# Patient Record
Sex: FEMALE | Race: BLACK OR AFRICAN AMERICAN | NOT HISPANIC OR LATINO | Employment: OTHER | URBAN - METROPOLITAN AREA
[De-identification: names, ages, dates, MRNs, and addresses within clinical notes are randomized per-mention and may not be internally consistent; named-entity substitution may affect disease eponyms.]

---

## 2018-05-23 ENCOUNTER — OFFICE VISIT (OUTPATIENT)
Dept: SURGERY | Facility: CLINIC | Age: 53
End: 2018-05-23
Payer: MEDICAID

## 2018-05-23 VITALS
HEIGHT: 64 IN | SYSTOLIC BLOOD PRESSURE: 124 MMHG | HEART RATE: 69 BPM | WEIGHT: 212.94 LBS | BODY MASS INDEX: 36.35 KG/M2 | DIASTOLIC BLOOD PRESSURE: 73 MMHG

## 2018-05-23 DIAGNOSIS — R15.0 INCOMPLETE DEFECATION: Primary | ICD-10-CM

## 2018-05-23 DIAGNOSIS — K59.04 FUNCTIONAL CONSTIPATION: ICD-10-CM

## 2018-05-23 DIAGNOSIS — N81.6 RECTOCELE: ICD-10-CM

## 2018-05-23 PROCEDURE — 99999 PR PBB SHADOW E&M-NEW PATIENT-LVL III: CPT | Mod: PBBFAC,,, | Performed by: COLON & RECTAL SURGERY

## 2018-05-23 PROCEDURE — 99204 OFFICE O/P NEW MOD 45 MIN: CPT | Mod: S$PBB,,, | Performed by: COLON & RECTAL SURGERY

## 2018-05-23 PROCEDURE — 99203 OFFICE O/P NEW LOW 30 MIN: CPT | Mod: PBBFAC | Performed by: COLON & RECTAL SURGERY

## 2018-05-23 RX ORDER — MINERAL OIL
ENEMA (ML) RECTAL
Refills: 3 | COMMUNITY
Start: 2018-05-08

## 2018-05-23 RX ORDER — MONTELUKAST SODIUM 10 MG/1
TABLET ORAL
Refills: 3 | COMMUNITY
Start: 2018-05-08 | End: 2018-08-01

## 2018-05-23 RX ORDER — FLUOXETINE 10 MG/1
CAPSULE ORAL
Refills: 3 | COMMUNITY
Start: 2018-05-10

## 2018-05-23 RX ORDER — AMLODIPINE AND BENAZEPRIL HYDROCHLORIDE 10; 20 MG/1; MG/1
CAPSULE ORAL
Refills: 3 | COMMUNITY
Start: 2018-05-04

## 2018-05-23 RX ORDER — LINACLOTIDE 290 UG/1
CAPSULE, GELATIN COATED ORAL
Refills: 5 | COMMUNITY
Start: 2018-04-28

## 2018-05-23 RX ORDER — CLONAZEPAM 0.5 MG/1
0.5 TABLET ORAL 2 TIMES DAILY
Refills: 3 | COMMUNITY
Start: 2018-05-19

## 2018-05-23 RX ORDER — OXCARBAZEPINE 300 MG/1
600 TABLET, FILM COATED ORAL 2 TIMES DAILY
Refills: 3 | COMMUNITY
Start: 2018-05-10

## 2018-05-23 RX ORDER — ALBUTEROL SULFATE 90 UG/1
AEROSOL, METERED RESPIRATORY (INHALATION)
Refills: 3 | COMMUNITY
Start: 2018-05-10

## 2018-05-23 RX ORDER — ATORVASTATIN CALCIUM 20 MG/1
20 TABLET, FILM COATED ORAL DAILY
Refills: 3 | COMMUNITY
Start: 2018-05-08

## 2018-05-23 NOTE — LETTER
May 23, 2018      Gary Cotton MD  [address] [address]           [facility]  [address]  [facility] [address]  Phone: [phone]          Patient: Venus Rodriguez   MR Number: [id number]   YOB: 1965   Date of Visit: 5/23/2018       Dear Dr. Gary Cotton:    Thank you for referring Venus Rodriguez to me for evaluation. Attached you will find relevant portions of my assessment and plan of care.    If you have questions, please do not hesitate to call me. I look forward to following Venus Rodriguez along with you.    Sincerely,    Pérez Kuo MD    Enclosure  CC:  No Recipients    If you would like to receive this communication electronically, please contact [email or web address] or [phone] to request more information on [facility] access.    For providers and/or their staff who would like to refer a patient to [facility], please contact us through our one-stop-shop provider referral line, [facility], at [phone].    If you feel you have received this communication in error or would no longer like to receive these types of communications, please e-mail [email or web address]

## 2018-05-23 NOTE — PROGRESS NOTES
"COLORECTAL SURGERY  H&P      REASON FOR CONSULT:    Encounter Diagnosis   Name Primary?    Incomplete defecation Yes       SUBJECTIVE:     HISTORY OF PRESENT ILLNESS:  Venus Rodriguez is a 53 y.o. female who has been referred to colorectal surgery clinic for symptoms of constipation and incomplete defecation suspicious for rectocele.    Patient reports severe worsening of constipation ever since undergoing laparoscopic MINGO/BSO in 2007 by Dr. Braun at [facility].  She states she is unable to evacuate stool from her rectum which she describes as no longer being able to "push".  She has tried everything from Miralax to Amitiza for her constipation with no significant relief.  She is currently taking Linzess which she does states helped a little by means of making her stools more thin and liquid which makes them easier to pass.  She denies splinting through her vagina, but reports that she has to routinely on a daily basis use an enema bottle to flush out her rectum to get stool out which often comes out piecemeal.  Has multiple small difficult to pass BMs about 4x/day.  Occasional small amount of bleeding which she attributes to hemorrhoids.     Additional history pertinent for recurrent uncomplicated diverticulitis.  States she had around total of 4 episodes none of which have every required inpatient treatment.  She has never required any IR drainage or complications related to diverticulitis, and has only taken oral antibiotics outpatient which resolved her symptoms nicely.  States her last colonoscopy was in February this year, which showed some hemorrhoids and 1-2 polyps were removed.        Past medical history pertinent for, HTN, HLD, depression, anxiety, diverticulosis, seasonal allergies.  No history of MI or DVT/PE or clotting disorders.  She can easily go up a flight of stairs or walk a block without getting short of breath.     The patient's past surgical history is pertinent for prior laparoscopic " MINGO/BSA in 2007.     NKDA (only reports adverse symptoms with nausea or itching to sulfa and lortab).    Non-smoker.        MEDICATIONS:  Home Medications:  No current outpatient prescriptions on file prior to visit.     No current facility-administered medications on file prior to visit.        ALLERGIES:    Review of patient's allergies indicates:   Allergen Reactions    Lortab [hydrocodone-acetaminophen]     Sulfa (sulfonamide antibiotics)        PAST MEDICAL HISTORY:    History reviewed. No pertinent past medical history.    SURGICAL HISTORY:  History reviewed. No pertinent surgical history.    FAMILY HISTORY:  History reviewed. No pertinent family history.    SOCIAL HISTORY:  Social History   Substance Use Topics    Smoking status: Never Smoker    Smokeless tobacco: Never Used    Alcohol use Yes      Comment: 2-3 beers monthly        REVIEW OF SYSTEMS:  A 10-point review of systems is negative except for the above mentioned in the HPI.    OBJECTIVE:     Most Recent Vitals:  Pulse: 69 (05/23/18 1414)  BP: 124/73 (05/23/18 1414)      PHYSICAL EXAM:  AAO, NAD, well developed and well nourished.  Head normocephalic, atraumatic.  Trachea midline, neck supple.  Respirations unlabored with good inspiratory effort.  Heart regular rate and rhythm.  Abdomen soft, obese, nondistended, nontender to palpation.  DAWIT reveal grade 2 rectocele.      LABORATORY VALUES:  No results found for: WBC, HGB, HCT, PLT, HGBA1C  No results found for: NA, K, CL, CO2, BUN, CREATININE, GLU, CALCIUM, CAION, MG, PHOS, AST, ALT, ALKPHOS, BILITOT, BILIDIR, PROT, ALBUMIN, AMYLASE, LIPASE  No results found for: INR, PTT  No results found for: TSH, FREET4, PTH, CEA,       DIAGNOSTIC STUDIES:  None.    ASSESSMENT:     Venus Rodriguez is a 53 y.o. female referred for constipation and incomplete defecation with grade 2 rectocele.  Also noted history of recurrent uncomplicated diverticulitis.       PLAN:  · Recommended taking at least 2-3  capsules of fiber daily in addition to Linzess.  · Re-evaulate in clinic in 2 weeks.  · If symptoms not improved, will discuss scheduling for surgical repair at her follow-up visit.       Eleni Klein MD

## 2018-05-23 NOTE — PROGRESS NOTES
Patient ID:  Venus Rodriguez is a 53 y.o. female     Chief Complaint:   Chief Complaint   Patient presents with    Anal Itching    anal spasm    Constipation        HPI: Long history of constipation.     ROS:        Constitutional: No fever, chills, activity or appetite change.      HENT: No hearing loss, facial swelling, neck pain or stiffness.       Eyes: No discharge, itching and visual disturbance.      Respiratory: No apnea, cough, choking or shortness of breath.       Cardiovascular: No leg swelling or chest pain      Gastrointestinal: No abdominal distention or change in bowel habbits     Genitourinary: No dysuria, frequency or flank pain.      Musculoskeletal: No arthralgias or gait problem.      Neurological: No dizziness, seizures or weakness.      Hematological: No adenopathy.      Psychiatric/Behavioral: No hallucinations or behavioral problems.       PE:    APPEARANCE: Well nourished, well developed, in no acute distress.   CHEST: Lungs clear. Normal respiratory effort.  CARDIOVASCULAR: Normal rhythm. No edema.  ABDOMEN: Soft. No tenderness or masses.  Rectum:  Normal skin, NST, no masses or tenderness    Musculoskeletal: Symmetric, normal range of motion and strength.   Neurological: Alert and oriented to person, place, and time. Normal reflexes.   Skin: Skin is warm and dry.   Psychiatric: Normal mood and affect. Behavior is normal and appropriate.     Impression: rectocele    Encounter Diagnosis   Name Primary?    Incomplete defecation Yes       PLAN: trial of fiber. RTC 2 weeks. If no improve,ment consider rectocele repair.

## 2018-06-04 ENCOUNTER — TELEPHONE (OUTPATIENT)
Dept: SURGERY | Facility: CLINIC | Age: 53
End: 2018-06-04

## 2018-06-05 ENCOUNTER — TELEPHONE (OUTPATIENT)
Dept: SURGERY | Facility: CLINIC | Age: 53
End: 2018-06-05

## 2018-06-05 NOTE — TELEPHONE ENCOUNTER
----- Message from Keerthi Goldman sent at 6/5/2018 11:46 AM CDT -----  Contact: pt#[phone]  Patient Returning Call from [facility]    Who Left Message for Patient:Patricia   Communication Preference:callback  Additional Information:

## 2018-08-01 ENCOUNTER — OFFICE VISIT (OUTPATIENT)
Dept: SURGERY | Facility: CLINIC | Age: 53
End: 2018-08-01
Payer: MEDICAID

## 2018-08-01 VITALS
SYSTOLIC BLOOD PRESSURE: 103 MMHG | HEART RATE: 84 BPM | WEIGHT: 205 LBS | BODY MASS INDEX: 35.19 KG/M2 | DIASTOLIC BLOOD PRESSURE: 76 MMHG

## 2018-08-01 DIAGNOSIS — K59.04 FUNCTIONAL CONSTIPATION: Primary | ICD-10-CM

## 2018-08-01 PROCEDURE — 99999 PR PBB SHADOW E&M-EST. PATIENT-LVL III: CPT | Mod: PBBFAC,,, | Performed by: COLON & RECTAL SURGERY

## 2018-08-01 PROCEDURE — 99214 OFFICE O/P EST MOD 30 MIN: CPT | Mod: S$PBB,,, | Performed by: COLON & RECTAL SURGERY

## 2018-08-01 PROCEDURE — 99213 OFFICE O/P EST LOW 20 MIN: CPT | Mod: PBBFAC | Performed by: COLON & RECTAL SURGERY

## 2018-08-01 NOTE — PROGRESS NOTES
Patient ID:  Venus Rodriguez is a 53 y.o. female     Chief Complaint:   Chief Complaint   Patient presents with    Constipation        HPI: Long history of constipation. And IBS in on Linzes but did not take fiber.     ROS:        Constitutional: No fever, chills, activity or appetite change.      HENT: No hearing loss, facial swelling, neck pain or stiffness.       Eyes: No discharge, itching and visual disturbance.      Respiratory: No apnea, cough, choking or shortness of breath.       Cardiovascular: No leg swelling or chest pain      Gastrointestinal: No abdominal distention or change in bowel habbits     Genitourinary: No dysuria, frequency or flank pain.      Musculoskeletal: No arthralgias or gait problem.      Neurological: No dizziness, seizures or weakness.      Hematological: No adenopathy.      Psychiatric/Behavioral: No hallucinations or behavioral problems.       PE:    APPEARANCE: Well nourished, well developed, in no acute distress.   CHEST: Lungs clear. Normal respiratory effort.  CARDIOVASCULAR: Normal rhythm. No edema.  ABDOMEN: Soft. No tenderness or masses.  Rectum:  Normal skin, NST, no masses or tenderness    Musculoskeletal: Symmetric, normal range of motion and strength.   Neurological: Alert and oriented to person, place, and time. Normal reflexes.   Skin: Skin is warm and dry.   Psychiatric: Normal mood and affect. Behavior is normal and appropriate.     Impression: rectocele. IBS     PLAN: Fiber daily. RTC PRN.